# Patient Record
Sex: MALE | Race: WHITE | NOT HISPANIC OR LATINO | Employment: OTHER | URBAN - NONMETROPOLITAN AREA
[De-identification: names, ages, dates, MRNs, and addresses within clinical notes are randomized per-mention and may not be internally consistent; named-entity substitution may affect disease eponyms.]

---

## 2023-09-29 ENCOUNTER — HOSPITAL ENCOUNTER (EMERGENCY)
Dept: DATA CONVERSION | Facility: HOSPITAL | Age: 23
Discharge: HOME | End: 2023-09-30
Attending: EMERGENCY MEDICINE | Admitting: EMERGENCY MEDICINE
Payer: MEDICARE

## 2023-09-29 DIAGNOSIS — T18.108A FOREIGN BODY IN ESOPHAGUS, INITIAL ENCOUNTER: Primary | ICD-10-CM

## 2023-09-29 DIAGNOSIS — T18.9XXA FOREIGN BODY OF ALIMENTARY TRACT, PART UNSPECIFIED, INITIAL ENCOUNTER: ICD-10-CM

## 2023-09-29 PROCEDURE — 9990 CHARGE CONVERSION: Mod: JZ

## 2023-09-29 PROCEDURE — 88305 TISSUE EXAM BY PATHOLOGIST: CPT

## 2023-09-29 PROCEDURE — 99285 EMERGENCY DEPT VISIT HI MDM: CPT | Mod: 25

## 2023-09-29 PROCEDURE — 96374 THER/PROPH/DIAG INJ IV PUSH: CPT | Mod: 59

## 2023-09-29 PROCEDURE — 71045 X-RAY EXAM CHEST 1 VIEW: CPT

## 2023-09-29 PROCEDURE — 96375 TX/PRO/DX INJ NEW DRUG ADDON: CPT | Mod: 59

## 2023-09-30 VITALS — BODY MASS INDEX: 29.91 KG/M2 | WEIGHT: 240.52 LBS | HEIGHT: 75 IN

## 2023-09-30 PROCEDURE — 71045 X-RAY EXAM CHEST 1 VIEW: CPT

## 2023-09-30 PROCEDURE — 9990 CHARGE CONVERSION

## 2023-10-01 NOTE — OP NOTE
Post Operative Note:     PreOp Diagnosis: Esophageal foreign body   Post-Procedure Diagnosis: Esophageal foreign body  Eosinophilic esophagitis   Procedure: 1.  EGD with biopsy,  2.  EGD with foreign body removal    4.   5.   Surgeon: Rosmery   Resident/Fellow/Other Assistant: None   Anesthesia: General, Dr. Block   Estimated Blood Loss (mL): none   Specimen: yes. Mid, distal esophagus and antrum   Findings: Foreign body distal esophagus advanced  with gentle pressure at the fundus.  Esophageal markings showed linear erosions with exudate suggestive of eosinophilic esophagitis in mid and distal esophagus.   Additional Details: Endoscopy performed to third  portion duodenum.  Duodenum, stomach normal.  Linear markings throughout mid distal esophagus suggestive of eosinophilic esophagitis biopsies taken.  Additional biopsies taken from antrum.    We will begin PPI therapy, Protonix 40 mg daily follow-up in office.     Operative Report Dictated:  Dictation: not applicable - note contains Operative  Report   Operative Report:    Patient brought into the operating suite.  General anesthesia induced by Dr. Elmer Block after appropriate monitoring applied.  Physical exam performed prior to  anesthesia induction documented in history and physical.    The endoscope was advanced into the oral cavity and patient in supine position.  Oral secretion was suctioned away.  Upper esophagus was normal.  Mid and distal esophagus revealed linear erosions and punctate exudates suggestive of eosinophilic esophagitis.   Large amount of fluid and a distal foreign body was noted consistent with a meat bolus in the distal esophagus just above the GE junction.  Was advanced under gentle pressure into the fundus.  Pullback into the distal esophagus revealed some linear ulcerations  consistent with pressure necrosis biopsies were taken from distal esophagus and midesophagus for pathology.  Examination of the cardia on retroflexion body  of the stomach and antrum were unremarkable.  Additional biopsies taken from the antrum for evaluation  of H. pylori.  Pylorus was widely patent duodenal bulb through D3 was normal.    Stomach was deflated the procedure was terminated oral secretions were suctioned and the patient was maintained sedated and then extubated by anesthesia.  He was taken from the OR in stable condition to the postoperative recovery area.    Family updated on patient's condition.  He will follow-up in my office.  Be placed on Protonix 40 mg daily.      Electronic Signatures:  Akshat Botello)  (Signed 29-Sep-2023 23:29)   Authored: Post Operative Note, Note Completion      Last Updated: 29-Sep-2023 23:29 by Akshat Botello ()

## 2023-10-02 PROCEDURE — 9990 CHARGE CONVERSION

## 2023-10-04 NOTE — ED PROVIDER NOTES
HPI   Chief Complaint   Patient presents with    Other     SOMETHING STUCK IN THROAT       HPI                    No data recorded                Patient History   No past medical history on file.  No past surgical history on file.  No family history on file.  Social History     Tobacco Use    Smoking status: Not on file    Smokeless tobacco: Not on file   Substance Use Topics    Alcohol use: Not on file    Drug use: Not on file       Physical Exam   ED Triage Vitals   Temp Pulse Resp BP   -- -- -- --      SpO2 Temp src Heart Rate Source Patient Position   -- -- -- --      BP Location FiO2 (%)     -- --       Physical Exam    ED Course & MDM   Diagnoses as of 10/04/23 0404   Foreign body in esophagus, initial encounter       Medical Decision Making      Procedure  Procedures     Gorge Trejo,   10/04/23 0404

## 2023-10-05 LAB
COMPLETE PATHOLOGY REPORT: NORMAL
CONVERTED CLINICAL DIAGNOSIS-HISTORY: NORMAL
CONVERTED FINAL DIAGNOSIS: NORMAL
CONVERTED FINAL REPORT PDF LINK TO COPY AND PASTE: NORMAL
CONVERTED GROSS DESCRIPTION: NORMAL

## 2023-10-12 NOTE — CONSULTS
Service:   Service: Gastroenterology     History of Present Illness:   HPI:    SHELBY LAKE is a 23 year old Male seen in ER for esophageal foreign body.  Patient has history of prior esophageal foreign body typically able to clear with  drinking liquids.  This evening was eating steak approximately 730.  Upon the first bite of food felt food lodged in his midesophagus attempted to clear with large volumes of water unable eventually presented to the emergency room was given 1 mg of glucagon  IV after IV was established without improvement.  Obvious signs of foreign body with inability to handle her secretions.  Endoscopy team was called in for urgent endoscopy.    Patient seen at bedside in ER he was walking around the room spitting into a large bucket.  He denies any prior history of endoscopy has not had any anesthetics in the past.  No history of GERD.  Family history negative for esophageal stricture, hiatal  hernia.  Denies any head neck radiation or caustic ingestion.    He does not smoke or drink.  Works as a  and does concrete on the side.    Family history unremarkable for esophageal foreign body.  No family history of anesthetic course sedative reactions.    10 system review negative.    Review Family/Social History and ROS:   Social History:    Smoking Status: light user (uses <10 cig/day, OR  <0.5 ppd, OR 1 can/pouch loose leaf tobacco per week, OR <0.5 vape pods per day) (1)   Alcohol Use: denies (1)   Drug Use: denies  (1)            Allergies:  ·  No Known Allergies :     Objective:     Objective Information:        T   P  R  BP   MAP  SpO2   Value  36.8  72  16  139/90      95%  Date/Time 9/29 20:33 9/29 22:00 9/29 22:00 9/29 22:00    9/29 22:00  Range  (36.8C - 36.8C )  (72 - 84 )  (16 - 18 )  (136 - 144 )/ (90 - 100 )    (95% - 98% )    Physical Exam by System:    Constitutional: Anxious well-appearing male with  obvious signs of foreign body   Eyes: PERRL, EOMI, clear sclera   ENMT:  mucous membranes moist, no apparent injury,  no lesions seen   Head/Neck: Neck supple, no apparent injury, thyroid  without mass or tenderness, No JVD, trachea midline, no bruits   Respiratory/Thorax: Patent airways, CTAB, normal  breath sounds with good chest expansion, thorax symmetric   Cardiovascular: Regular, rate and rhythm, no murmurs,  2+ equal pulses of the extremities, normal S 1and S 2   Gastrointestinal: Nondistended, soft, non-tender,  no rebound tenderness or guarding, no masses palpable, no organomegaly, +BS, no bruits   Musculoskeletal: ROM intact, no joint swelling, normal  strength   Extremities: normal extremities, no cyanosis edema,  contusions or wounds, no clubbing   Neurological: alert and oriented x3, intact senses,  motor, response and reflexes, normal strength   Lymphatic: No significant lymphadenopathy   Psychological: Appropriate mood and behavior   Skin: Warm and dry, no lesions, no rashes     Medications:    Medications:          Continuous Medications       --------------------------------    1. Lactated Ringers Infusion:  1000  mL  IntraVenous  <Continuous>    2. Lactated Ringers Infusion:  1000  mL  IntraVenous  <Continuous>         Scheduled Medications       --------------------------------    1. dexAMETHasone Injectable:  8  mg  IntraVenous Push  Once    2. Ondansetron Injectable:  4  mg  IntraVenous Push  Once         PRN Medications       --------------------------------    1. Labetalol Injectable:  5  mg  IntraVenous Push  Once    2. Morphine Injectable:  4  mg  IntraVenous Push  Every 5 Minutes    3. Naloxone Injectable:  0.2  mg  IntraVenous Push  Once    4. oxyCODONE Immediate Release:  5  mg  Oral  Every 4 Hours    5. oxyCODONE Immediate Release:  5  mg  Oral  Every 4 Hours    6. Promethazine IV Piggy Back:  6.25  mg  IntraVenous Piggyback  Once        Radiology Results:    Results:        Impression:    1.  No radiographic evidence of acute cardiopulmonary process.   "No  intrathoracic radiopaque foreign body.           MACRO:  None.     Xray Chest 1 View [Sep 29 2023  9:30PM]        Assessment:    3-year-old male with signs symptoms of esophageal foreign body.  Discussed endoscopy.  Agreeable to same.  Risk of esophageal injury perforation inability to clear  esophageal obstruction.  Discussed need for anesthesia support given his esophageal foreign body.  Discussed with anesthesia will arrange this evening.    Consult Status:  Consult Status    (select all that apply): follow-up  after discharge       Electronic Signatures:  Akshat Botello)  (Signed 29-Sep-2023 22:50)   Authored: Service, History of Present Illness, Review  Family/Social History and ROS, Allergies, Objective, Assessment/Recommendations, Note Completion      Last Updated: 29-Sep-2023 22:50 by Akshat Botello ()    References:  1.  Data Referenced From \"Risk Screen - Adult Emergency\" 29-Sep-2023 20:47   "

## 2023-10-13 PROCEDURE — 88305 TISSUE EXAM BY PATHOLOGIST: CPT

## 2023-10-13 PROCEDURE — 9990 CHARGE CONVERSION

## 2024-04-13 ENCOUNTER — HOSPITAL ENCOUNTER (EMERGENCY)
Facility: HOSPITAL | Age: 24
Discharge: HOME | End: 2024-04-13
Attending: EMERGENCY MEDICINE
Payer: MEDICARE

## 2024-04-13 ENCOUNTER — HOSPITAL ENCOUNTER (OUTPATIENT)
Dept: CARDIOLOGY | Facility: HOSPITAL | Age: 24
Discharge: HOME | End: 2024-04-13
Payer: MEDICARE

## 2024-04-13 ENCOUNTER — APPOINTMENT (OUTPATIENT)
Dept: RADIOLOGY | Facility: HOSPITAL | Age: 24
End: 2024-04-13
Payer: MEDICARE

## 2024-04-13 VITALS
WEIGHT: 245 LBS | DIASTOLIC BLOOD PRESSURE: 82 MMHG | BODY MASS INDEX: 28.35 KG/M2 | RESPIRATION RATE: 19 BRPM | SYSTOLIC BLOOD PRESSURE: 129 MMHG | HEIGHT: 78 IN | HEART RATE: 77 BPM | OXYGEN SATURATION: 98 % | TEMPERATURE: 98.6 F

## 2024-04-13 DIAGNOSIS — R91.1 LUNG NODULE SEEN ON IMAGING STUDY: ICD-10-CM

## 2024-04-13 DIAGNOSIS — J20.9 ACUTE BRONCHITIS, UNSPECIFIED ORGANISM: Primary | ICD-10-CM

## 2024-04-13 LAB
ANION GAP SERPL CALC-SCNC: 11 MMOL/L (ref 10–20)
BASOPHILS # BLD AUTO: 0.09 X10*3/UL (ref 0–0.1)
BASOPHILS NFR BLD AUTO: 0.7 %
BUN SERPL-MCNC: 13 MG/DL (ref 6–23)
CALCIUM SERPL-MCNC: 9.1 MG/DL (ref 8.6–10.3)
CHLORIDE SERPL-SCNC: 104 MMOL/L (ref 98–107)
CO2 SERPL-SCNC: 26 MMOL/L (ref 21–32)
CREAT SERPL-MCNC: 0.95 MG/DL (ref 0.5–1.3)
D DIMER PPP FEU-MCNC: 579 NG/ML FEU
EGFRCR SERPLBLD CKD-EPI 2021: >90 ML/MIN/1.73M*2
EOSINOPHIL # BLD AUTO: 0.71 X10*3/UL (ref 0–0.7)
EOSINOPHIL NFR BLD AUTO: 5.4 %
ERYTHROCYTE [DISTWIDTH] IN BLOOD BY AUTOMATED COUNT: 12.7 % (ref 11.5–14.5)
GLUCOSE SERPL-MCNC: 100 MG/DL (ref 74–99)
HCT VFR BLD AUTO: 46.5 % (ref 41–52)
HETEROPH AB SERPLBLD QL IA.RAPID: NEGATIVE
HGB BLD-MCNC: 15.8 G/DL (ref 13.5–17.5)
IMM GRANULOCYTES # BLD AUTO: 0.27 X10*3/UL (ref 0–0.7)
IMM GRANULOCYTES NFR BLD AUTO: 2.1 % (ref 0–0.9)
LYMPHOCYTES # BLD AUTO: 1.87 X10*3/UL (ref 1.2–4.8)
LYMPHOCYTES NFR BLD AUTO: 14.3 %
MCH RBC QN AUTO: 27.9 PG (ref 26–34)
MCHC RBC AUTO-ENTMCNC: 34 G/DL (ref 32–36)
MCV RBC AUTO: 82 FL (ref 80–100)
MONOCYTES # BLD AUTO: 1.19 X10*3/UL (ref 0.1–1)
MONOCYTES NFR BLD AUTO: 9.1 %
NEUTROPHILS # BLD AUTO: 8.95 X10*3/UL (ref 1.2–7.7)
NEUTROPHILS NFR BLD AUTO: 68.4 %
NRBC BLD-RTO: 0 /100 WBCS (ref 0–0)
PLATELET # BLD AUTO: 247 X10*3/UL (ref 150–450)
POTASSIUM SERPL-SCNC: 4.3 MMOL/L (ref 3.5–5.3)
RBC # BLD AUTO: 5.66 X10*6/UL (ref 4.5–5.9)
SODIUM SERPL-SCNC: 137 MMOL/L (ref 136–145)
WBC # BLD AUTO: 13.1 X10*3/UL (ref 4.4–11.3)

## 2024-04-13 PROCEDURE — 99285 EMERGENCY DEPT VISIT HI MDM: CPT | Mod: 25

## 2024-04-13 PROCEDURE — 86308 HETEROPHILE ANTIBODY SCREEN: CPT | Performed by: EMERGENCY MEDICINE

## 2024-04-13 PROCEDURE — 71045 X-RAY EXAM CHEST 1 VIEW: CPT

## 2024-04-13 PROCEDURE — 85379 FIBRIN DEGRADATION QUANT: CPT | Performed by: EMERGENCY MEDICINE

## 2024-04-13 PROCEDURE — 82374 ASSAY BLOOD CARBON DIOXIDE: CPT | Performed by: EMERGENCY MEDICINE

## 2024-04-13 PROCEDURE — 85025 COMPLETE CBC W/AUTO DIFF WBC: CPT | Performed by: EMERGENCY MEDICINE

## 2024-04-13 PROCEDURE — 2550000001 HC RX 255 CONTRASTS: Performed by: EMERGENCY MEDICINE

## 2024-04-13 PROCEDURE — 71275 CT ANGIOGRAPHY CHEST: CPT | Mod: FOREIGN READ | Performed by: RADIOLOGY

## 2024-04-13 PROCEDURE — 93005 ELECTROCARDIOGRAM TRACING: CPT

## 2024-04-13 PROCEDURE — 36415 COLL VENOUS BLD VENIPUNCTURE: CPT | Performed by: EMERGENCY MEDICINE

## 2024-04-13 PROCEDURE — 71045 X-RAY EXAM CHEST 1 VIEW: CPT | Mod: FOREIGN READ | Performed by: RADIOLOGY

## 2024-04-13 PROCEDURE — 71275 CT ANGIOGRAPHY CHEST: CPT

## 2024-04-13 RX ORDER — PREDNISONE 50 MG/1
50 TABLET ORAL DAILY
Qty: 6 TABLET | Refills: 0 | Status: SHIPPED | OUTPATIENT
Start: 2024-04-13 | End: 2024-04-19

## 2024-04-13 RX ORDER — ALBUTEROL SULFATE 90 UG/1
1-2 AEROSOL, METERED RESPIRATORY (INHALATION) EVERY 6 HOURS PRN
Qty: 18 G | Refills: 0 | Status: SHIPPED | OUTPATIENT
Start: 2024-04-13 | End: 2024-05-13

## 2024-04-13 RX ADMIN — IOHEXOL 50 ML: 350 INJECTION, SOLUTION INTRAVENOUS at 13:24

## 2024-04-13 RX ADMIN — IOHEXOL 50 ML: 350 INJECTION, SOLUTION INTRAVENOUS at 13:25

## 2024-04-13 ASSESSMENT — COLUMBIA-SUICIDE SEVERITY RATING SCALE - C-SSRS
1. IN THE PAST MONTH, HAVE YOU WISHED YOU WERE DEAD OR WISHED YOU COULD GO TO SLEEP AND NOT WAKE UP?: NO
6. HAVE YOU EVER DONE ANYTHING, STARTED TO DO ANYTHING, OR PREPARED TO DO ANYTHING TO END YOUR LIFE?: NO
2. HAVE YOU ACTUALLY HAD ANY THOUGHTS OF KILLING YOURSELF?: NO

## 2024-04-13 ASSESSMENT — PAIN DESCRIPTION - PAIN TYPE: TYPE: ACUTE PAIN

## 2024-04-13 ASSESSMENT — VISUAL ACUITY: OU: 1

## 2024-04-13 ASSESSMENT — PAIN - FUNCTIONAL ASSESSMENT: PAIN_FUNCTIONAL_ASSESSMENT: 0-10

## 2024-04-13 ASSESSMENT — PAIN DESCRIPTION - LOCATION: LOCATION: THROAT

## 2024-04-13 ASSESSMENT — PAIN SCALES - GENERAL: PAINLEVEL_OUTOF10: 6

## 2024-04-13 NOTE — ED PROVIDER NOTES
Multiple complaints.  This 24-year-old white male presents to the ED with complaint of sore throat and nasal congestion symptoms that started last weekend on Saturday states that since then his symptoms have gotten worse he was seen in urgent care center on Wednesday he was tested for influenza, COVID and strep all of which was negative he states that he has been sleeping excessively since onset of symptoms he has a cough that is productive of mucus and notes some blood tinged mucus today.  He states he also feels short of breath.  Patient admits to smoking 1 pack/day of cigarettes normally though has not been able to smoke since onset of his congestion.  He denies any ill contacts that he knows of.      History provided by:  Patient   used: No         Physical Exam  Vitals and nursing note reviewed.   Constitutional:       General: He is awake.      Appearance: Normal appearance. He is obese.   HENT:      Head: Normocephalic and atraumatic.      Right Ear: Hearing and external ear normal.      Left Ear: Hearing and external ear normal.      Nose: Congestion and rhinorrhea present.      Mouth/Throat:      Mouth: Mucous membranes are moist.      Pharynx: Oropharynx is clear. Uvula midline. Posterior oropharyngeal erythema present. No pharyngeal swelling, oropharyngeal exudate or uvula swelling.   Eyes:      General: Lids are normal. Vision grossly intact.         Right eye: No discharge.         Left eye: No discharge.      Extraocular Movements: Extraocular movements intact.      Conjunctiva/sclera: Conjunctivae normal.      Pupils: Pupils are equal, round, and reactive to light.   Cardiovascular:      Rate and Rhythm: Normal rate and regular rhythm.      Pulses: Normal pulses.      Heart sounds: Normal heart sounds. No murmur heard.     No friction rub. No gallop.   Pulmonary:      Effort: Pulmonary effort is normal. No respiratory distress.      Breath sounds: Normal breath sounds. No stridor.  No wheezing, rhonchi or rales.   Chest:      Chest wall: No tenderness.   Abdominal:      General: Abdomen is flat. Bowel sounds are normal. There is no distension.      Palpations: Abdomen is soft. There is no mass.      Tenderness: There is no abdominal tenderness. There is no guarding or rebound.      Hernia: No hernia is present.      Comments: Patient has a benign abdominal exam   Musculoskeletal:         General: No swelling, tenderness, deformity or signs of injury. Normal range of motion.      Cervical back: Full passive range of motion without pain, normal range of motion and neck supple.      Right lower leg: Normal. No edema.      Left lower leg: Normal. No edema.   Skin:     General: Skin is warm and dry.      Capillary Refill: Capillary refill takes less than 2 seconds.      Coloration: Skin is not jaundiced or pale.      Findings: No bruising, erythema, lesion or rash.   Neurological:      General: No focal deficit present.      Mental Status: He is alert and oriented to person, place, and time.      GCS: GCS eye subscore is 4. GCS verbal subscore is 5. GCS motor subscore is 6.      Cranial Nerves: Cranial nerves 2-12 are intact. No cranial nerve deficit.      Sensory: Sensation is intact. No sensory deficit.      Motor: Motor function is intact. No weakness.      Coordination: Coordination is intact. Coordination normal.      Deep Tendon Reflexes: Reflexes normal.   Psychiatric:         Attention and Perception: Attention and perception normal.         Mood and Affect: Mood and affect normal.         Speech: Speech normal.         Behavior: Behavior normal. Behavior is cooperative.         Thought Content: Thought content normal.         Cognition and Memory: Cognition and memory normal.         Judgment: Judgment normal.          Labs Reviewed   CBC WITH AUTO DIFFERENTIAL - Abnormal       Result Value    WBC 13.1 (*)     nRBC 0.0      RBC 5.66      Hemoglobin 15.8      Hematocrit 46.5      MCV 82       MCH 27.9      MCHC 34.0      RDW 12.7      Platelets 247      Neutrophils % 68.4      Immature Granulocytes %, Automated 2.1 (*)     Lymphocytes % 14.3      Monocytes % 9.1      Eosinophils % 5.4      Basophils % 0.7      Neutrophils Absolute 8.95 (*)     Immature Granulocytes Absolute, Automated 0.27      Lymphocytes Absolute 1.87      Monocytes Absolute 1.19 (*)     Eosinophils Absolute 0.71 (*)     Basophils Absolute 0.09     BASIC METABOLIC PANEL - Abnormal    Glucose 100 (*)     Sodium 137      Potassium 4.3      Chloride 104      Bicarbonate 26      Anion Gap 11      Urea Nitrogen 13      Creatinine 0.95      eGFR >90      Calcium 9.1     D-DIMER, VTE EXCLUSION - Abnormal    D-Dimer, Quantitative VTE Exclusion 579 (*)     Narrative:     The VTE Exclusion D-Dimer assay is reported in ng/mL Fibrinogen Equivalent Units (FEU).    Per 's instructions for use, a value of less than 500 ng/mL (FEU) may help to exclude DVT or PE in outpatients when the assay is used with a clinical pretest probability assessment.([facility] must utilize and document eCalc 'Wells Score Deep Vein Thrombosis Risk' for DVT exclusion only. Emergency Department should utilize  Guidelines for Emergency Department Use of the VTE Exclusion D-Dimer and Clinical Pretest probability assessment model for DVT or PE exclusion.)   MONONUCLEOSIS SCREEN (HETEROPHILE ANTIBODY) - Normal    Mononucleosis Screen Negative          CT angio chest for pulmonary embolism   Final Result   No evidence of pulmonary embolism or aortic dissection.   4 mm semisolid nodule anteriorly in the right lower lobe.  Follow-up   should BE as per the Fleischner Society guidelines.   Fleischner Society 2017 Guidelines for Management of Incidentally   Detected Pulmonary Nodules in Adults:   A.  SOLID NODULES*   Nodule Type and Size:   Single:   *Low Risk**    < 6 mm - No routine follow-up   6-8 mm - CT at 6-12 months, then consider CT at 18-24 months   > 8 mm -  Consider CT at 3 months, PET/CT, or tissue sampling   *High Risk**   < 6 mm - Optional CT at 12 months   6-8 mm - CT at 6-12 months, then CT at 18-24 months   > 8 mm - Consider CT at 3 months, PET/CT, or tissue sampling   Multiple:   *Low Risk**    < 6 mm - No routine follow-up   6-8 mm - CT at 3-6 months, then consider CT at 18-24 months   > 8 mm - CT at 3-6 months, then consider CT at 18-24 months   *High Risk**   < 6 mm - Optional CT at 12 months   6-8 mm - CT at 3-6 months, then at 18-24 months   > 8 mm - CT at 3-6 months, then at 18-24 months   B. SUBSOLID NODULES*   Nodule Type and Size:   Single:     *Ground glass   < 6 mm - No routine follow-up   > 6 mm - CT at 6-12 months to confirm persistence, then CT every 2   years until 5 years   *Part Solid   < 6 mm - No routine follow-up   > 6 mm - CT at 3-6 months to confirm persistence.  If unchanged and   solid component remains < 6 mm, annual CT should be performed for 5   years.   Multiple:   < 6 mm - CT at 3-6 months.  If stable, consider CT at 2 and 4 years.   > 6 mm - CT at 3-6 months.  Subsequent management based on the most   suspicious nodule(s).   Note - These recommendations do not apply to lung cancer screening,   patients with immunosuppression, or patients with known primary   cancer.   * Dimensions are average of long and short axes, rounded to the   nearest millimeter.   ** Consider all relevant risk factors.   Signed by Polo Car MD      XR chest 1 view   Final Result   No definitive regions of airspace consolidation.   Signed by Gorge Horvath MD           Procedures     Medical Decision Making  Patient was seen and evaluated due to complaints of sore throat symptoms with congestion cough and hemoptysis or shortness of breath low-grade fever.  Patient previously tested for strep, COVID and influenza were negative.  Patient clinically does have some erythema posterior oropharynx without evidence of respiratory distress.  Patient was ordered basic  blood work with a white cell count 13.1 without left shift.  Basic metabolic panel was unremarkable D-dimer was high at 579 chest x-ray was negative.  CTA of the chest was ordered and this revealed no evidence of pulmonary embolism or aortic dissection.  There was a 4 mm semisolid nodule anteriorly in the right lower lobe I did have a discussion with the patient concerning his lab findings, chest x-ray findings and CT scan of the chest.  Patient was diagnosed with acute bronchitis and a lung nodule he was discharged home prescription for prednisone for 6 days as well as albuterol MDI with spacer.  He was referred to her primary care doctor for follow-up he may require further testing a year from now due to this pulmonary nodule.    Amount and/or Complexity of Data Reviewed  ECG/medicine tests: independent interpretation performed.     Details: EKG was interpreted by myself at 12:03 PM reveals normal sinus rhythm 82 bpm with no acute ST or T wave changes noted.  NM interval is 160 ms.  The QRS durations 88 ms.  The QTc is 411 ms.  Axis is 42 degrees.         Diagnoses as of 04/14/24 0758   Acute bronchitis, unspecified organism   Lung nodule seen on imaging study                    Adolph Kamara,   04/14/24 0759

## 2024-04-15 LAB
ATRIAL RATE: 82 BPM
P AXIS: 43 DEGREES
P OFFSET: 196 MS
P ONSET: 139 MS
PR INTERVAL: 160 MS
Q ONSET: 219 MS
QRS COUNT: 14 BEATS
QRS DURATION: 88 MS
QT INTERVAL: 352 MS
QTC CALCULATION(BAZETT): 411 MS
QTC FREDERICIA: 390 MS
R AXIS: 42 DEGREES
T AXIS: 27 DEGREES
T OFFSET: 395 MS
VENTRICULAR RATE: 82 BPM